# Patient Record
Sex: FEMALE | Race: WHITE | NOT HISPANIC OR LATINO | Employment: FULL TIME | URBAN - METROPOLITAN AREA
[De-identification: names, ages, dates, MRNs, and addresses within clinical notes are randomized per-mention and may not be internally consistent; named-entity substitution may affect disease eponyms.]

---

## 2022-11-08 ENCOUNTER — TELEPHONE (OUTPATIENT)
Dept: OTOLARYNGOLOGY | Facility: CLINIC | Age: 37
End: 2022-11-08
Payer: COMMERCIAL

## 2022-11-08 NOTE — TELEPHONE ENCOUNTER
----- Message from Daiana Putnam sent at 11/8/2022  3:45 PM CST -----  Regarding: appointment  Contact: patient  Type:  Sooner Appointment Request    Caller is requesting a sooner appointment.  Caller declined first available appointment listed below.  Caller will not accept being placed on the waitlist and is requesting a message be sent to doctor.    Name of Caller:  patient  When is the first available appointment?    Symptoms:  fullness in ears  Best Call Back Number:  [phone] (work)  Additional Information: Patient was referred by Dr Butcher and would like a second opinion.  Please call patient to schedule with doctor. Thanks!

## 2022-11-15 ENCOUNTER — CLINICAL SUPPORT (OUTPATIENT)
Dept: AUDIOLOGY | Facility: CLINIC | Age: 37
End: 2022-11-15
Payer: COMMERCIAL

## 2022-11-15 ENCOUNTER — OFFICE VISIT (OUTPATIENT)
Dept: OTOLARYNGOLOGY | Facility: CLINIC | Age: 37
End: 2022-11-15
Payer: COMMERCIAL

## 2022-11-15 VITALS — BODY MASS INDEX: 29.02 KG/M2 | TEMPERATURE: 99 F | WEIGHT: 170 LBS | HEIGHT: 64 IN

## 2022-11-15 DIAGNOSIS — M26.623 BILATERAL TEMPOROMANDIBULAR JOINT PAIN: ICD-10-CM

## 2022-11-15 DIAGNOSIS — H93.13 TINNITUS, BILATERAL: Primary | ICD-10-CM

## 2022-11-15 DIAGNOSIS — J30.9 ALLERGIC RHINITIS, UNSPECIFIED SEASONALITY, UNSPECIFIED TRIGGER: ICD-10-CM

## 2022-11-15 DIAGNOSIS — H93.8X3 SENSATION OF FULLNESS IN EAR, BILATERAL: Primary | ICD-10-CM

## 2022-11-15 DIAGNOSIS — Z01.10 NORMAL HEARING TEST OF BOTH EARS: ICD-10-CM

## 2022-11-15 PROCEDURE — 99999 PR PBB SHADOW E&M-EST. PATIENT-LVL I: ICD-10-PCS | Mod: PBBFAC,,,

## 2022-11-15 PROCEDURE — 3008F PR BODY MASS INDEX (BMI) DOCUMENTED: ICD-10-PCS | Mod: CPTII,S$GLB,, | Performed by: NURSE PRACTITIONER

## 2022-11-15 PROCEDURE — 3008F BODY MASS INDEX DOCD: CPT | Mod: CPTII,S$GLB,, | Performed by: NURSE PRACTITIONER

## 2022-11-15 PROCEDURE — 1159F PR MEDICATION LIST DOCUMENTED IN MEDICAL RECORD: ICD-10-PCS | Mod: CPTII,S$GLB,, | Performed by: NURSE PRACTITIONER

## 2022-11-15 PROCEDURE — 92557 COMPREHENSIVE HEARING TEST: CPT | Mod: S$GLB,,, | Performed by: AUDIOLOGIST-HEARING AID FITTER

## 2022-11-15 PROCEDURE — 99203 OFFICE O/P NEW LOW 30 MIN: CPT | Mod: S$GLB,,, | Performed by: NURSE PRACTITIONER

## 2022-11-15 PROCEDURE — 92567 PR TYMPA2METRY: ICD-10-PCS | Mod: S$GLB,,, | Performed by: AUDIOLOGIST-HEARING AID FITTER

## 2022-11-15 PROCEDURE — 99999 PR PBB SHADOW E&M-EST. PATIENT-LVL III: ICD-10-PCS | Mod: PBBFAC,,, | Performed by: NURSE PRACTITIONER

## 2022-11-15 PROCEDURE — 99999 PR PBB SHADOW E&M-EST. PATIENT-LVL I: CPT | Mod: PBBFAC,,,

## 2022-11-15 PROCEDURE — 1159F MED LIST DOCD IN RCRD: CPT | Mod: CPTII,S$GLB,, | Performed by: NURSE PRACTITIONER

## 2022-11-15 PROCEDURE — 99203 PR OFFICE/OUTPT VISIT, NEW, LEVL III, 30-44 MIN: ICD-10-PCS | Mod: S$GLB,,, | Performed by: NURSE PRACTITIONER

## 2022-11-15 PROCEDURE — 92557 PR COMPREHENSIVE HEARING TEST: ICD-10-PCS | Mod: S$GLB,,, | Performed by: AUDIOLOGIST-HEARING AID FITTER

## 2022-11-15 PROCEDURE — 92567 TYMPANOMETRY: CPT | Mod: S$GLB,,, | Performed by: AUDIOLOGIST-HEARING AID FITTER

## 2022-11-15 PROCEDURE — 99999 PR PBB SHADOW E&M-EST. PATIENT-LVL III: CPT | Mod: PBBFAC,,, | Performed by: NURSE PRACTITIONER

## 2022-11-15 RX ORDER — BUTALBITAL, ACETAMINOPHEN AND CAFFEINE 50; 325; 40 MG/1; MG/1; MG/1
1 TABLET ORAL
COMMUNITY
Start: 2022-07-27

## 2022-11-15 RX ORDER — MONTELUKAST SODIUM 10 MG/1
10 TABLET ORAL DAILY
COMMUNITY
Start: 2022-11-12

## 2022-11-15 RX ORDER — CYCLOBENZAPRINE HCL 10 MG
10 TABLET ORAL
COMMUNITY
Start: 2022-11-08

## 2022-11-15 RX ORDER — SULFAMETHOXAZOLE AND TRIMETHOPRIM 800; 160 MG/1; MG/1
1 TABLET ORAL 2 TIMES DAILY
COMMUNITY
Start: 2022-11-08

## 2022-11-15 RX ORDER — BUSPIRONE HYDROCHLORIDE 10 MG/1
10 TABLET ORAL 2 TIMES DAILY
COMMUNITY
Start: 2022-10-16

## 2022-11-15 RX ORDER — PANTOPRAZOLE SODIUM 40 MG/1
40 TABLET, DELAYED RELEASE ORAL 2 TIMES DAILY
COMMUNITY
Start: 2022-11-12

## 2022-11-15 NOTE — PATIENT INSTRUCTIONS
"When the eustachian tube is functioning normally, every single time you swallow, yawn, blow your nose, etc., your ears will pop.  This popping is when the eustachian tube opens transiently allowing air to pass from the middle ear to the back of the nose which is open to the environment.  Assuming there is no ear wax or fluid behind the eardrum, ear fullness is usually due to the inability to pop the ears easily.  A pressure difference between the air pressure behind the eardrum and the air pressure outside of the eardrum develops.  When there is a pressure difference, the eardrum can bulge inward or outward creating a sensation of fullness for the person.     EUSTACHIAN TUBE INSTRUCTIONS:  Nasal valsalva:  Pinch nose and with closed mouth try to "pop" air into ears through the back of the nose. Attempt this several times a day. The more popping you have, the more likely the fluid will resolve.     Flonase / fluticasone (steroid spray) is best for stuffy, pressure, fullness.  Astelin / azelastine (antihistamine spray) is best for itchy, drippy, sneezy.    Use as directed, spraying 1-2 times in each nostril each day. It may take 2-3 days to 2-3 weeks to begin seeing improvement. This medication needs to be taken consistently to see results. Overall, this is a well-tolerated medication with low side effects. The benefit of nasal steroids as opposed to oral steroids is that the nasal steroid spray works primarily in the nose. Common side effects can include: headache, nasal dryness, minor nose bleed.  Rare side effects may include:  septal perforation, elevation in eye pressure, dry eyes, change in smell, allergic reaction.  Notify your provider if you have any concerns or experience these symptoms.     Nasal spray instructions:  Blow nose first gently to clean. Keep chin level with the floor (do not tilt head forward or back). Insert nasal spray taking caution to direct it AWAY from the middle wall inside the nose " (septum) to avoid irritating nasal septum which could cause nosebleed.  Do not tilt spray up but rather flat and out along the roof of your mouth to spray. Angle the tip of the spray out slightly toward the direction of the ears; then spray. Do not take quick vigorous sniff but rather slow gentle inhalation while waiting for medication to absorb into nasal passages. Then administer second spray in same way.     These are conservative approaches to treating Eustachian tube congestion^^^.  However if this does not help relieve your symptoms, you may want to consider returning to see one of our ENT surgeons to discuss the possibility of procedures such as myringotomy, placement of PE tubes, or Eustachian tube balloon dilation.

## 2022-11-15 NOTE — PROGRESS NOTES
Subjective:       Patient ID: Brianne Kramer is a 37 y.o. female.    Chief Complaint: itchy ears; Ear Fullness; clicking, popping noises when swallowing; and ear pressure    Ear Fullness     Patient is new to ENT, self-referred for ear fullness. Patient reports ear fullness and pressure AU causing hearing loss. Feels like she has to read lips. Will valsalva and have transient moments of hearing clarity but does not last long and then right back to muffled hearing again. She receives immunotherapy via Dr. Butcher's office and takes allergy meds daily but does not seem to help ears. She takes PPI and H2-blocker for GERD; she does not eat 3 hrs before laying down and sleeps on 3 pillows, but still awakened regularly by dyspepsia. She had a swallowing study which reportedly showed only slight reflux. She also has TMJ and wears a mouth-guard. She takes flexeril for TMJ and feels like she can hear better on flexeril so not sure if her aural symptoms are exacerbated by TMJ.     Review of Systems   Constitutional: Negative.    HENT: Negative.     Eyes: Negative.    Respiratory: Negative.     Cardiovascular: Negative.    Gastrointestinal: Negative.    Musculoskeletal: Negative.    Integumentary:  Negative.   Neurological: Negative.    Hematological: Negative.    Psychiatric/Behavioral: Negative.         Objective:      Physical Exam  Vitals and nursing note reviewed.   Constitutional:       General: She is not in acute distress.     Appearance: She is well-developed. She is not ill-appearing.   HENT:      Head: Normocephalic and atraumatic.      Right Ear: Hearing, tympanic membrane, ear canal and external ear normal. No middle ear effusion. Tympanic membrane is not erythematous.      Left Ear: Hearing, tympanic membrane, ear canal and external ear normal.  No middle ear effusion. Tympanic membrane is not erythematous.      Nose: Nose normal.   Eyes:      General: Lids are normal. No scleral icterus.        Right eye: No  discharge.         Left eye: No discharge.   Neck:      Trachea: Trachea normal. No tracheal deviation.   Cardiovascular:      Rate and Rhythm: Normal rate.   Pulmonary:      Effort: Pulmonary effort is normal. No respiratory distress.      Breath sounds: No stridor. No wheezing.   Musculoskeletal:         General: Normal range of motion.      Cervical back: Normal range of motion and neck supple.   Skin:     General: Skin is warm and dry.   Neurological:      Mental Status: She is alert and oriented to person, place, and time.      Coordination: Coordination normal.      Gait: Gait normal.   Psychiatric:         Attention and Perception: Attention normal.         Mood and Affect: Mood normal.         Speech: Speech normal.         Behavior: Behavior normal. Behavior is cooperative.         Assessment:       Problem List Items Addressed This Visit    None  Visit Diagnoses       Sensation of fullness in ear, bilateral    -  Primary    Bilateral temporomandibular joint pain        Allergic rhinitis, unspecified seasonality, unspecified trigger        Normal hearing test of both ears                Plan:     When the eustachian tube is functioning normally, every single time you swallow, yawn, blow your nose, etc., your ears will pop.  This popping is when the eustachian tube opens transiently allowing air to pass from the middle ear to the back of the nose which is open to the environment.  Assuming there is no ear wax or fluid behind the eardrum, ear fullness is usually due to the inability to pop the ears easily.  A pressure difference between the air pressure behind the eardrum and the air pressure outside of the eardrum develops.  When there is a pressure difference, the eardrum can bulge inward or outward creating a sensation of fullness for the person.     ETD handout given and discussed. Also discussed TMJ may be contributing. May also have a slight GERD component. Discussed treatment options for each. Greater  than 50% face-to-face counseling of 40 minute visit spent in review of all of above. Patient encouraged to return to clinic if symptoms worsen/persist and as needed for further ENT symptoms or concerns.

## 2022-11-15 NOTE — PROGRESS NOTES
Brianne Kramer was seen 11/15/2022 for an audiological evaluation. Pertinent complains today include hearing loss and occasional tinnitus AU. 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 She denies family Hx of HL. She says her ears pop and click when she yawns.     Results reveal normal hearing from 250-8000Hz bilaterally.    Speech Reception Thresholds were  0 dBHL for the right ear and 0 dBHL for the left ear.    Word recognition scores were excellent bilaterally.   Tympanograms were Type A for the right ear and Type A for the left ear.    Audiogram results were reviewed in detail with patient and all questions were answered. Results will be reviewed by the referring provider at the completion of this note. Recommend repeat hearing testing if problems arise and bilateral hearing protection with either muffs or in-ear protection in loud noises.